# Patient Record
(demographics unavailable — no encounter records)

---

## 2025-01-13 NOTE — PHYSICAL EXAM
[Chaperone Present] : A chaperone was present in the examining room during all aspects of the physical examination [11633] : A chaperone was present during the pelvic exam. [Examination Of The Breasts] : a normal appearance [No Masses] : no breast masses were palpable [Labia Majora] : normal [Labia Minora] : normal [Normal] : normal [Uterine Adnexae] : normal [FreeTextEntry2] : DAVE

## 2025-01-13 NOTE — HISTORY OF PRESENT ILLNESS
[FreeTextEntry1] : HERE FOR HER ANNUAL EXAM ***  METASTATIC MELANOMA**** FEELS WELL HERE FOR CHRONIC VULVITIS VISIT

## 2025-07-28 NOTE — HEALTH RISK ASSESSMENT
[Yes] : Yes [Monthly or less (1 pt)] : Monthly or less (1 point) [1 or 2 (0 pts)] : 1 or 2 (0 points) [Never (0 pts)] : Never (0 points) [No] : In the past 12 months have you used drugs other than those required for medical reasons? No [No falls in past year] : Patient reported no falls in the past year [0] : 1) Little interest or pleasure doing things: Not at all (0) [1] : 2) Feeling down, depressed, or hopeless for several days (1) [Never] : Never [Audit-CScore] : 1 [de-identified] : walking [de-identified] : trying [[id number]] : 1

## 2025-07-28 NOTE — HISTORY OF PRESENT ILLNESS
[FreeTextEntry1] : follow up [de-identified] : follow up   main issue- anxiety has been going through a lot of stress saw her therapist- gave her xanax pharmacist didnt think that was a great idea pain Mangement started Cymbalta for duel action on pain relief and anxiety has not started that yet    htn- well controlled with losartan  hld- controlled on statin

## 2025-07-28 NOTE — ASSESSMENT
[FreeTextEntry1] : follow up   main issue- anxiety has been going through a lot of stress saw her therapist- gave her xanax pharmacist didnt think that was a great idea pain Mangement started Cymbalta for duel action on pain relief and anxiety has not started that yet follow up 2-3 weeks after starting Cymbalta    htn- well controlled with losartan  hld- controlled on statin